# Patient Record
Sex: FEMALE | Race: WHITE
[De-identification: names, ages, dates, MRNs, and addresses within clinical notes are randomized per-mention and may not be internally consistent; named-entity substitution may affect disease eponyms.]

---

## 2017-04-03 NOTE — UC
Throat Pain/Nasal Car HPI





- HPI Summary


HPI Summary: 





complaint of nasal congestion adncough that started last ngiht


feels feverish and has chills


sore throat


headaches


entire body feelsachy


denies N/V/D


not taking any medication for symptoms








- History of Current Complaint


Chief Complaint: UCRespiratory


Stated Complaint: CHEST PAIN WHEN COUGHING


Time Seen by Provider: 04/03/17 20:44


Hx Obtained From: Patient


Hx Last Menstrual Period: 3/10/17





- Allergies/Home Medications


Allergies/Adverse Reactions: 


 Allergies











Allergy/AdvReac Type Severity Reaction Status Date / Time


 


No Known Allergies Allergy   Verified 02/22/14 18:26











Home Medications: 


 Home Medications





NK [No Home Medications Reported]  04/03/17 [History Confirmed 04/03/17]











PMH/Surg Hx/FS Hx/Imm Hx


Previously Healthy: Yes


Endocrine History Of: 


   Denies: Diabetes, Thyroid Disease


Cardiovascular History Of: 


   Denies: Cardiac Disorders, Hypertension


Respiratory History Of: 


   Denies: COPD, Asthma


GI/ History Of: 


   Denies: Ulcer





- Surgical History


Surgical History: None





- Family History


Known Family History: 


   Negative: Cardiac Disease, Hypertension, Diabetes





- Social History


Occupation: Employed Full-time


Lives: With Family


Alcohol Use: None


Substance Use Type: None


Smoking Status (MU): Never Smoked Tobacco





Review of Systems


Constitutional: Fever, Chills, Fatigue


Skin: Negative


Eyes: Negative


ENT: Sore Throat, Nasal Discharge


Respiratory: Cough


Cardiovascular: Negative


Gastrointestinal: Negative


Genitourinary: Negative


Motor: Negative


Neurovascular: Negative


Musculoskeletal: Myalgia


Neurological: Headache


Psychological: Negative


All Other Systems Reviewed And Are Negative: Yes





Physical Exam


Triage Information Reviewed: Yes


Appearance: Well-Appearing, No Pain Distress


Vital Signs: 


 Initial Vital Signs











Temp  97.7 F   04/03/17 20:36


 


Pulse  109   04/03/17 20:36


 


Resp  18   04/03/17 20:36


 


BP  115/73   04/03/17 20:36


 


Pulse Ox  100   04/03/17 20:36











Vital Signs Reviewed: Yes


Eyes: Positive: Conjunctiva Clear


ENT: Positive: Pharyngeal erythema, Nasal congestion, TMs normal


Neck: Positive: No Lymphadenopathy


Respiratory: Positive: Lungs clear, Normal breath sounds, No respiratory 

distress


Cardiovascular: Positive: RRR, No Murmur, Pulses Normal


Abdomen Description: Positive: Nontender, Soft


Bowel Sounds: Positive: Present


Musculoskeletal: Positive: No Edema


Neurological: Positive: Alert


Psychological Exam: Normal


Skin Exam: Normal





Throat Pain/Nasal Course/Dx





- Differential Dx/Diagnosis


Differential Diagnosis/HQI/PQRI: Influenza, Pharyngitis, URI


Provider Diagnoses: influenza





Discharge





- Discharge Plan


Condition: Stable


Disposition: HOME


Patient Education Materials:  Influenza (ED)


Referrals: 


No Primary Care Phys,NOPCP [Primary Care Provider] - 


[facility] [Outside]


Additional Instructions: 


TREATING THE FLU 


(Influenza) 


What is the Flu? 


Influenza, or "flu," is an infection of the breathing tubes and lungs. Flu 

happens mostly in late fall, winter, or early spring. It is very easily spread 

from one person to another by coughing and sneezing. The flu affects people of 

all ages. 


Symptoms Might Include: 


 Stuffed up or runny nose 


 Cough  which may be worse at night  that lasts for one to two weeks 


 Fever  especially the first 2 days  and which may go up and down 


 Headache and muscle aches 


 Mild sore throat 


 Poor appetite 


 Tiredness 





Influenza (Flu) Vaccine 


Much of the illness and death caused by influenza can be prevented by annual 

flu vaccination. It is especially recommended for people who are at high risk. 

Those at higher risk include all people aged 65 years or older and people of 

any age with chronic diseases of the heart, lung or kidneys, diabetes, 

immunosuppression, or severe forms of anemia. Other high-risk groups are, women 

who will be more than 3 months pregnant during the flu season, and children. 


Treatment Recommendations: 


 Take acetaminophen (Tylenol, etc.) for aches and fever. Do not ever give 

aspirin to children.  Drink lots of fluids. 


 Use a cool-mist humidifier night and day if it helps you. 


 Keep room at a comfortable temperature for you. Do not overheat the room. Do 

not overdress. 


 Do not smoke. 


 Get as much rest as you can. 





Call Your Doctor or Return Here IF: 


 You have a fever that lasts for more than three days. 


 You have trouble breathing. 


 You begin to cough up green, yellow, or red mucous. 


 Your cough gets worse or you have chest pain with coughing or deep breathing. 


 You start to have any other symptoms that worry you.

## 2018-06-06 ENCOUNTER — HOSPITAL ENCOUNTER (INPATIENT)
Dept: HOSPITAL 25 - MCHOBOUT | Age: 33
LOS: 2 days | Discharge: HOME | DRG: 560 | End: 2018-06-08
Attending: MIDWIFE | Admitting: MIDWIFE
Payer: COMMERCIAL

## 2018-06-06 DIAGNOSIS — Z3A.40: ICD-10-CM

## 2018-06-06 LAB
HCT VFR BLD AUTO: 42 % (ref 35–47)
HGB BLD-MCNC: 14.2 G/DL (ref 12–16)
MCH RBC QN AUTO: 32 PG (ref 27–31)
MCHC RBC AUTO-ENTMCNC: 34 G/DL (ref 31–36)
MCV RBC AUTO: 95 FL (ref 80–97)
RBC # BLD AUTO: 4.44 10^6/UL (ref 4–5.4)
WBC # BLD AUTO: 12.4 10^3/UL (ref 3.5–10.8)

## 2018-06-06 PROCEDURE — 0HQ9XZZ REPAIR PERINEUM SKIN, EXTERNAL APPROACH: ICD-10-PCS | Performed by: MIDWIFE

## 2018-06-06 PROCEDURE — 10907ZC DRAINAGE OF AMNIOTIC FLUID, THERAPEUTIC FROM PRODUCTS OF CONCEPTION, VIA NATURAL OR ARTIFICIAL OPENING: ICD-10-PCS | Performed by: MIDWIFE

## 2018-06-06 PROCEDURE — 85025 COMPLETE CBC W/AUTO DIFF WBC: CPT

## 2018-06-06 PROCEDURE — 86900 BLOOD TYPING SEROLOGIC ABO: CPT

## 2018-06-06 PROCEDURE — 4A1HX4Z MONITORING OF PRODUCTS OF CONCEPTION, CARDIAC ELECTRICAL ACTIVITY, EXTERNAL APPROACH: ICD-10-PCS | Performed by: MIDWIFE

## 2018-06-06 PROCEDURE — 86850 RBC ANTIBODY SCREEN: CPT

## 2018-06-06 PROCEDURE — 86901 BLOOD TYPING SEROLOGIC RH(D): CPT

## 2018-06-06 PROCEDURE — 36415 COLL VENOUS BLD VENIPUNCTURE: CPT

## 2018-06-06 PROCEDURE — 85060 BLOOD SMEAR INTERPRETATION: CPT

## 2018-06-06 NOTE — HP
General Information





- General Information


Maternal Age: 32


Grav: 4


Para: 3


SAB: 0


IEA: 0


LC: 3


Estimated Due Date: 18


Determined By: LMP


Gestational Age in Weeks and Days: 39 Weeks and 6 Days


Maternal Blood Type and Rh: A Positive





- Results this Pregnancy


Serology/RPR Result: Non-Reactive


Rubella Result: Immune


HBsAg Result: Negative


HIV Result: Negative


GBS Culture Result: Negative





Past Medical History


Delivery History: Hx Uncomplicated Vaginal Delivery - ,  14 HRS, MALE, 6'

8". ,  14 HRS, FEMALE 8'. , Hx Complicated Vaginal Delivery -   

FEMALE 8' INDUCTION FOR HTN


Pertinent Past Medical History: See  Records - LATE CARE SEEKER; LOW 

LYING PLACENTA RESOLVED


Pertinent Past Surgical History: None


Pertinent Family History: Non-Contributory





- Antepartal Records


Antepartal Records: Reviewed, Pregnancy Uncomplicated





Review of Systems


Constitutional: Comfortable


CV Complaint: No


Respiratory: Shortness of Breath: No


Gastrointestinal: No Nausea/Vomiting


Genitourinary: No Dysuria, No Leaking Fluid


Musculoskeletal: Contractions


Neurological: No Headache, No Visual Changes


Fetal Movement: Normal





Exam


Allergies/Adverse Reactions: 


Allergies





No Known Allergies Allergy (Verified 18 20:15)


 











t 97.4; p 71; bp 122/75; r 20





- Measurements


Height: 5 ft 5 in


Weight: 162 lb


Weight in lbs: 162


Body Mass Index (BMI): 26.9


Pre-Pregnancy Weight: 138 lb


Weight Gained This Pregnancy: 24 lbs and 0 ozs





- Exam


Abdomen: No Upper Quadrant Pain


Breast: Breast Exam Deferred


CVA: No CVA Tenderness


Extremities: No Edema


Heart: Normal Rhythm/Heart Sounds


HEENT: No Significant Findings


Lungs: Clear Bilaterally


Rectal: Rectal Exam Deferred


Thyroid: No Thyromegaly





- Abdominal Exam


Abdomen Exam: Non-Tender, Fundal Height Consistent with Dates





Targeted Exam Findings


See L&D Outpatient Visit Provider Note for Findings: N/A


Estimated Fetal Weight: 7lbs


Cervical Exam: 5cm


Effacement: 70%


Station: -1


Presenting Part: Vertex


Membrane Status: Bulging


Bleeding/Discharge: Bloody Show





EFM Findings





- External Fetal Monitor Findings


Baseline Fetal Heart Rate: 150


External Fetal Monitor Findings: Accelerations Present, No Pattern of Variable 

or Late Decelerations, Variability Moderate, Baseline Stable


Contractions: Irregular, Moderate





Assessment/Plan





- Reason for Visit


Reason for Visit: 





labor evaluation 





- Plan


Plan: Observe, Early Labor





- Date/Time of Admission


Date of Admission: 18


Time of Admission: 20:59

## 2018-06-07 LAB
BASOPHILS # BLD AUTO: 0.1 10^3/UL (ref 0–0.2)
EOSINOPHIL # BLD AUTO: 0 10^3/UL (ref 0–0.6)
LYMPHOCYTES # BLD AUTO: 1.2 10^3/UL (ref 1–4.8)
MONOCYTES # BLD AUTO: 0.6 10^3/UL (ref 0–0.8)
NEUTROPHILS # BLD AUTO: 10.5 10^3/UL (ref 1.5–7.7)
NRBC # BLD AUTO: 0 10^3/UL
NRBC BLD QL AUTO: 0
PLATELET # BLD AUTO: 90 10^3/UL (ref 150–450)

## 2018-06-07 RX ADMIN — IBUPROFEN PRN MG: 600 TABLET, FILM COATED ORAL at 14:55

## 2018-06-07 RX ADMIN — IBUPROFEN PRN MG: 600 TABLET, FILM COATED ORAL at 01:55

## 2018-06-07 RX ADMIN — DOCUSATE SODIUM SCH MG: 100 CAPSULE, LIQUID FILLED ORAL at 08:17

## 2018-06-07 RX ADMIN — IBUPROFEN PRN MG: 600 TABLET, FILM COATED ORAL at 08:17

## 2018-06-07 RX ADMIN — DOCUSATE SODIUM SCH MG: 100 CAPSULE, LIQUID FILLED ORAL at 14:55

## 2018-06-07 RX ADMIN — DOCUSATE SODIUM SCH MG: 100 CAPSULE, LIQUID FILLED ORAL at 21:07

## 2018-06-08 VITALS — SYSTOLIC BLOOD PRESSURE: 105 MMHG | DIASTOLIC BLOOD PRESSURE: 61 MMHG

## 2018-06-08 RX ADMIN — DOCUSATE SODIUM SCH MG: 100 CAPSULE, LIQUID FILLED ORAL at 08:04

## 2018-06-08 RX ADMIN — IBUPROFEN PRN MG: 600 TABLET, FILM COATED ORAL at 08:03
